# Patient Record
Sex: FEMALE | Race: OTHER | Employment: OTHER | URBAN - METROPOLITAN AREA
[De-identification: names, ages, dates, MRNs, and addresses within clinical notes are randomized per-mention and may not be internally consistent; named-entity substitution may affect disease eponyms.]

---

## 2017-08-30 NOTE — PATIENT DISCUSSION
[facility] during summer months may make prone to styes by causing oil in meibomian glands to increase in viscosity.

## 2017-08-30 NOTE — PATIENT DISCUSSION
Recommended warm compresses twice daily with a hard boiled egg.  Ed needs to massage lesion after heating with warm compress.

## 2017-10-27 NOTE — PATIENT DISCUSSION
Discussed risk and benefits of surgical treatment vs. medical management.  for now tx with lubricants and topical steroid.  sample Durezol bid for 5 days, qd for 10 days.  PFATs 4x a day for a couple week.  If recurrent to consider medical Tx again vs surgical excision.

## 2017-10-27 NOTE — PATIENT DISCUSSION
[facility] during summer months may make prone to styes by causing oil in meibomian glands to increase in viscosity.

## 2018-03-06 ENCOUNTER — ESTABLISHED COMPREHENSIVE EXAM (OUTPATIENT)
Dept: URBAN - METROPOLITAN AREA CLINIC 39 | Facility: CLINIC | Age: 63
End: 2018-03-06

## 2018-03-06 VITALS
RESPIRATION RATE: 14 BRPM | SYSTOLIC BLOOD PRESSURE: 158 MMHG | HEIGHT: 55 IN | HEART RATE: 60 BPM | DIASTOLIC BLOOD PRESSURE: 94 MMHG

## 2018-03-06 DIAGNOSIS — H43.813: ICD-10-CM

## 2018-03-06 DIAGNOSIS — H25.812: ICD-10-CM

## 2018-03-06 DIAGNOSIS — H25.811: ICD-10-CM

## 2018-03-06 DIAGNOSIS — Z98.890: ICD-10-CM

## 2018-03-06 DIAGNOSIS — H18.51: ICD-10-CM

## 2018-03-06 PROCEDURE — 92014 COMPRE OPH EXAM EST PT 1/>: CPT

## 2018-03-06 PROCEDURE — 92015 DETERMINE REFRACTIVE STATE: CPT

## 2018-03-06 PROCEDURE — 92286 ANT SGM IMG I&R SPECLR MIC: CPT

## 2018-03-06 ASSESSMENT — VISUAL ACUITY
OS_SC: J1
OS_GLARE: 20/50
OS_SC: 20/80
OS_CC: 20/20-1
OD_SC: 20/200+1
OD_GLARE: 20/50
OS_CC: J1+
OD_CC: J1+
OD_SC: J1+
OD_CC: 20/20-2

## 2018-03-06 ASSESSMENT — TONOMETRY
OD_IOP_MMHG: 14
OS_IOP_MMHG: 14

## 2018-06-25 NOTE — PATIENT DISCUSSION
[facility] during summer months may make prone to styes by causing oil in meibomian glands to increase in viscosity.

## 2018-06-25 NOTE — PATIENT DISCUSSION
Discussed risk and benefits of surgical treatment vs. medical management.  steroid trial did NOT help.  consult [facility] for eval and excision OD.

## 2018-07-17 NOTE — PATIENT DISCUSSION
[facility] during summer months may make prone to styes by causing oil in meibomian glands to increase in viscosity.

## 2018-07-17 NOTE — PATIENT DISCUSSION
Discussed risk and benefits of surgical treatment vs. medical management.  steroid trial did NOT help.  Rec excision of Simple Conjunctival Cyst. Pt to use Moxifloxacin QID X 7 days then D/C.

## 2018-07-17 NOTE — PATIENT DISCUSSION
advised pt that cyst may grow back, and if it does it will have to be surgically removed in the [facility].

## 2019-06-27 NOTE — PATIENT DISCUSSION
advised pt that cyst may grow back, and if it does it will have to be surgically removed in the [facility].

## 2019-06-27 NOTE — PATIENT DISCUSSION
[facility], [place] during summer months may make prone to styes by causing oil in meibomian glands to increase in viscosity.

## 2020-06-25 NOTE — PATIENT DISCUSSION
advised pt that cyst may grow back, and if it does it will have to be surgically removed in the [facility].

## 2020-06-25 NOTE — PATIENT DISCUSSION
[facility] during summer months may make prone to styes by causing oil in meibomian glands to increase in viscosity.

## 2020-06-25 NOTE — PATIENT DISCUSSION
6/25/2020:  Rx shift OU due to cataracts.  IOLs with FD.  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

## 2020-06-25 NOTE — PATIENT DISCUSSION
Minimal risk for malignant transformation discussed with patient. Recommend to observe and follow.  baseline photo taken today to monitor with serial analysis.

## 2020-08-04 NOTE — PATIENT DISCUSSION
advised pt that cyst may grow back, and if it does it will have to be surgically removed in the [facility].

## 2020-08-04 NOTE — PATIENT DISCUSSION
[facility] during summer months may make prone to styes by causing oil in meibomian glands to increase in viscosity.

## 2020-08-13 NOTE — PATIENT DISCUSSION
[facility] [place] during summer months may make prone to styes by causing oil in meibomian glands to increase in viscosity.

## 2020-08-13 NOTE — PATIENT DISCUSSION
[facility] during summer months may make prone to styes by causing oil in meibomian glands to increase in viscosity.

## 2020-08-13 NOTE — PATIENT DISCUSSION
advised pt that cyst may grow back, and if it does it will have to be surgically removed in the [facility].

## 2020-08-14 NOTE — PATIENT DISCUSSION
advised pt that cyst may grow back, and if it does it will have to be surgically removed in the [facility].

## 2020-08-18 NOTE — PATIENT DISCUSSION
ok to proceed with IOL due to [address] for sx made today with KMS and Goal: CV thaddeus.  ED WILL need NVO for ALL tasks inside arm's length.

## 2020-08-18 NOTE — PATIENT DISCUSSION
advised pt that cyst may grow back, and if it does it will have to be surgically removed in the [facility].

## 2020-08-18 NOTE — PATIENT DISCUSSION
[facility] [place], [facility] during summer months may make prone to styes by causing oil in meibomian glands to increase in viscosity.

## 2020-08-20 NOTE — PATIENT DISCUSSION
[facility] during summer months may make prone to styes by causing oil in meibomian glands to increase in viscosity.

## 2020-08-20 NOTE — PATIENT DISCUSSION
[facility] during summer months may make prone to styes by causing oil in meibomian glands to increase in viscosity.

## 2020-08-20 NOTE — PATIENT DISCUSSION
advised pt that cyst may grow back, and if it does it will have to be surgically removed in the [facility].

## 2020-08-20 NOTE — PATIENT DISCUSSION
advised pt that cyst may grow back, and if it does it will have to be surgically removed in the [facility].

## 2020-08-21 NOTE — PATIENT DISCUSSION
advised pt that cyst may grow back, and if it does it will have to be surgically removed in the [facility].

## 2020-08-21 NOTE — PATIENT DISCUSSION
[facility] during summer months may make prone to styes by causing oil in meibomian glands to increase in viscosity.

## 2020-09-11 NOTE — PATIENT DISCUSSION
joseph [facility] during summer months may make prone to styes by causing oil in meibomian glands to increase in viscosity.

## 2020-09-11 NOTE — PATIENT DISCUSSION
advised pt that cyst may grow back, and if it does it will have to be surgically removed in the [facility].

## 2020-10-28 NOTE — PATIENT DISCUSSION
[facility] during summer months may make prone to styes by causing oil in meibomian glands to increase in viscosity.

## 2020-10-28 NOTE — PATIENT DISCUSSION
advised pt that cyst may grow back, and if it does it will have to be surgically removed in the [facility].

## 2022-07-03 NOTE — PATIENT DISCUSSION
[facility] during summer months may make prone to styes by causing oil in meibomian glands to increase in viscosity. None